# Patient Record
Sex: MALE | Race: WHITE | NOT HISPANIC OR LATINO | URBAN - NONMETROPOLITAN AREA
[De-identification: names, ages, dates, MRNs, and addresses within clinical notes are randomized per-mention and may not be internally consistent; named-entity substitution may affect disease eponyms.]

---

## 2017-03-28 RX ORDER — LISINOPRIL AND HYDROCHLOROTHIAZIDE 20; 12.5 MG/1; MG/1
1 TABLET ORAL DAILY
Qty: 30 TABLET | Refills: 2 | Status: SHIPPED | OUTPATIENT
Start: 2017-03-28 | End: 2017-06-30

## 2017-06-30 ENCOUNTER — OFFICE VISIT (OUTPATIENT)
Dept: FAMILY MEDICINE CLINIC | Facility: CLINIC | Age: 62
End: 2017-06-30

## 2017-06-30 VITALS
SYSTOLIC BLOOD PRESSURE: 144 MMHG | HEIGHT: 65 IN | DIASTOLIC BLOOD PRESSURE: 82 MMHG | BODY MASS INDEX: 28.49 KG/M2 | HEART RATE: 84 BPM | WEIGHT: 171 LBS

## 2017-06-30 DIAGNOSIS — Z12.5 PROSTATE CANCER SCREENING: ICD-10-CM

## 2017-06-30 DIAGNOSIS — M25.511 CHRONIC RIGHT SHOULDER PAIN: ICD-10-CM

## 2017-06-30 DIAGNOSIS — I10 ESSENTIAL HYPERTENSION: Primary | ICD-10-CM

## 2017-06-30 DIAGNOSIS — G89.29 CHRONIC RIGHT SHOULDER PAIN: ICD-10-CM

## 2017-06-30 PROCEDURE — 99213 OFFICE O/P EST LOW 20 MIN: CPT | Performed by: NURSE PRACTITIONER

## 2017-06-30 RX ORDER — TRAMADOL HYDROCHLORIDE 50 MG/1
TABLET ORAL
Qty: 30 TABLET | Refills: 0 | Status: SHIPPED | OUTPATIENT
Start: 2017-06-30 | End: 2018-07-24

## 2017-06-30 RX ORDER — LISINOPRIL 20 MG/1
20 TABLET ORAL DAILY
COMMUNITY
End: 2017-07-05

## 2017-07-03 RX ORDER — LISINOPRIL AND HYDROCHLOROTHIAZIDE 20; 12.5 MG/1; MG/1
TABLET ORAL
Qty: 30 TABLET | Refills: 4 | Status: SHIPPED | OUTPATIENT
Start: 2017-07-03 | End: 2017-07-05 | Stop reason: SDUPTHER

## 2017-07-05 RX ORDER — LISINOPRIL AND HYDROCHLOROTHIAZIDE 20; 12.5 MG/1; MG/1
1 TABLET ORAL DAILY
Qty: 30 TABLET | Refills: 4 | Status: SHIPPED | OUTPATIENT
Start: 2017-07-05 | End: 2017-12-11 | Stop reason: SDUPTHER

## 2017-07-07 RX ORDER — PREDNISONE 10 MG/1
TABLET ORAL
Qty: 21 TABLET | Refills: 0 | Status: SHIPPED | OUTPATIENT
Start: 2017-07-07 | End: 2018-07-24

## 2017-07-10 DIAGNOSIS — M75.81 BONE SPUR OF ACROMIOCLAVICULAR JOINT, RIGHT: Primary | ICD-10-CM

## 2017-07-18 ENCOUNTER — OFFICE VISIT (OUTPATIENT)
Dept: ORTHOPEDIC SURGERY | Facility: CLINIC | Age: 62
End: 2017-07-18

## 2017-07-18 VITALS — HEIGHT: 65 IN | WEIGHT: 171 LBS | BODY MASS INDEX: 28.49 KG/M2

## 2017-07-18 DIAGNOSIS — M25.511 ACUTE PAIN OF RIGHT SHOULDER: Primary | ICD-10-CM

## 2017-07-18 DIAGNOSIS — M19.011 OSTEOARTHRITIS OF RIGHT SHOULDER, UNSPECIFIED OSTEOARTHRITIS TYPE: ICD-10-CM

## 2017-07-18 DIAGNOSIS — M75.41 IMPINGEMENT SYNDROME, SHOULDER, RIGHT: ICD-10-CM

## 2017-07-18 PROBLEM — M75.40 IMPINGEMENT SYNDROME, SHOULDER: Status: ACTIVE | Noted: 2017-07-18

## 2017-07-18 PROCEDURE — 99214 OFFICE O/P EST MOD 30 MIN: CPT | Performed by: NURSE PRACTITIONER

## 2017-07-18 PROCEDURE — 20610 DRAIN/INJ JOINT/BURSA W/O US: CPT | Performed by: NURSE PRACTITIONER

## 2017-07-18 RX ORDER — TRIAMCINOLONE ACETONIDE 40 MG/ML
40 INJECTION, SUSPENSION INTRA-ARTICULAR; INTRAMUSCULAR
Status: COMPLETED | OUTPATIENT
Start: 2017-07-18 | End: 2017-07-18

## 2017-07-18 RX ORDER — LIDOCAINE HYDROCHLORIDE 10 MG/ML
2 INJECTION, SOLUTION INFILTRATION; PERINEURAL
Status: COMPLETED | OUTPATIENT
Start: 2017-07-18 | End: 2017-07-18

## 2017-07-18 RX ORDER — MELOXICAM 15 MG/1
15 TABLET ORAL DAILY
Qty: 30 TABLET | Refills: 1 | Status: SHIPPED | OUTPATIENT
Start: 2017-07-18 | End: 2017-08-17

## 2017-07-18 RX ADMIN — LIDOCAINE HYDROCHLORIDE 2 ML: 10 INJECTION, SOLUTION INFILTRATION; PERINEURAL at 10:05

## 2017-07-18 RX ADMIN — TRIAMCINOLONE ACETONIDE 40 MG: 40 INJECTION, SUSPENSION INTRA-ARTICULAR; INTRAMUSCULAR at 10:05

## 2017-07-18 NOTE — PROGRESS NOTES
Mark Gómez  is a 61 y.o. male   Primary provider:  ALIYA Marquez       Chief Complaint   Patient presents with   • Right Shoulder - Establish Care, Pain       HISTORY OF PRESENT ILLNESS:    HPI Comments: Complains of right shoulder pain since falling off a ramp in May 2017. Pain described as sharp at times and aching with popping, grinding and giving. Patient reports aching pain keeps him awake at night. Pain increased with movement of right arm. Improves with rest and applying pressure to the top of his shoulder. Seen by PCP last week, prescribed prednisone and had x-rays done.     Pain   This is a new problem. The current episode started more than 1 month ago. The problem occurs constantly. The problem has been unchanged. Associated symptoms comments: Sharp pain with dull ache, grinding, and popping. Exacerbated by: use of right arm. Treatments tried: steroids (prednisone) The treatment provided mild relief.        CONCURRENT MEDICAL HISTORY:    Past Medical History:   Diagnosis Date   • Acquired deformity of wrist    • Benign essential hypertension    • Cellulitis and abscess of neck    • Essential hypertension    • Pain in wrist    • Tobacco use        No Known Allergies      Current Outpatient Prescriptions:   •  diclofenac (VOLTAREN) 1 % gel gel, Apply  topically. Appply to wrist four times a day as needed for pain., Disp: , Rfl:   •  lisinopril-hydrochlorothiazide (PRINZIDE,ZESTORETIC) 20-12.5 MG per tablet, Take 1 tablet by mouth Daily., Disp: 30 tablet, Rfl: 4  •  meloxicam (MOBIC) 15 MG tablet, Take 1 tablet by mouth Daily for 30 days., Disp: 30 tablet, Rfl: 1  •  nicotine (NICODERM CQ) 14 MG/24HR patch, Place  on the skin. As directed, Disp: , Rfl:   •  predniSONE (DELTASONE) 10 MG tablet, Take po as dose pack, Disp: 21 tablet, Rfl: 0  •  traMADol (ULTRAM) 50 MG tablet, Take 1-2 po at HS for shoulder pain., Disp: 30 tablet, Rfl: 0    No past surgical history on file.    Family History  "  Problem Relation Age of Onset   • Cancer Mother         Social History     Social History   • Marital status: Single     Spouse name: N/A   • Number of children: N/A   • Years of education: N/A     Occupational History   • Not on file.     Social History Main Topics   • Smoking status: Current Every Day Smoker     Packs/day: 1.00     Types: Cigarettes   • Smokeless tobacco: Never Used   • Alcohol use Yes      Comment: Occasionally   • Drug use: Not on file   • Sexual activity: Defer     Other Topics Concern   • Not on file     Social History Narrative        Review of Systems   Musculoskeletal:        Stiffness in joints. Right shoulder pain.    Psychiatric/Behavioral: Positive for sleep disturbance.   All other systems reviewed and are negative.      PHYSICAL EXAMINATION:       Ht 65\" (165.1 cm)  Wt 171 lb (77.6 kg)  BMI 28.46 kg/m2    Physical Exam   Constitutional: He is oriented to person, place, and time. Vital signs are normal. He appears well-developed and well-nourished.   HENT:   Head: Normocephalic.   Pulmonary/Chest: Effort normal. No respiratory distress.   Abdominal: Soft. He exhibits no distension.   Neurological: He is alert and oriented to person, place, and time. GCS eye subscore is 4. GCS verbal subscore is 5. GCS motor subscore is 6.   Skin: Skin is warm, dry and intact.   Psychiatric: He has a normal mood and affect. His speech is normal and behavior is normal. Judgment and thought content normal. Cognition and memory are normal.   Vitals reviewed.      GAIT:     [x]  Normal  []  Antalgic    Assistive device: [x]  None  []  Walker     []  Crutches  []  Cane     []  Wheelchair  []  Stretcher    Right Shoulder Exam     Tenderness   The patient is experiencing tenderness in the acromioclavicular joint (diffuse, mild).    Range of Motion   Active Abduction:  150 abnormal   Passive Abduction:  160 abnormal   Forward Flexion:  150 abnormal   External Rotation: abnormal   Internal Rotation 90 " degrees: abnormal     Muscle Strength   The patient has normal right shoulder strength.    Tests   Cross Arm: positive  Hawkin's test: positive  Impingement: positive    Other   Erythema: absent  Scars: absent  Sensation: normal  Pulse: present    Comments:  Significant limitations with external rotation attempt. Pain with internal rotation, abduction and forward flexion. Empty can test positive.         Large Joint Arthrocentesis  Date/Time: 7/18/2017 10:05 AM  Consent given by: patient  Timeout: Immediately prior to procedure a time out was called to verify the correct patient, procedure, equipment, support staff and site/side marked as required   Supporting Documentation  Indications: pain and diagnostic evaluation   Procedure Details  Location: shoulder - R subacromial bursa  Preparation: Patient was prepped and draped in the usual sterile fashion  Needle size: 22 G  Approach: posterior  Medications administered: 40 mg triamcinolone acetonide 40 MG/ML; 2 mL lidocaine 1 %  Patient tolerance: patient tolerated the procedure well with no immediate complications          Xr Shoulder 2+ View Right    Result Date: 7/10/2017  Narrative: Procedure: Right    shoulder INDICATION: Right shoulder pain   . Trauma patient fell. Technique: Three    views. Prior examination: None. FINDINGS: Atherosclerotic changes, joint space narrowing glenohumeral joint. Degenerative changes right acromioclavicular joint. Large distal clavicular spur projecting inferiorly. This may cause underlying impingement. The right shoulder is otherwise unremarkable.     Impression: CONCLUSION: Arthritic changes glenohumeral joint. Degenerative changes right, acromioclavicular  joint. Large distal clavicular spur projecting inferiorly. This may cause underlying impingement. The right shoulder is otherwise unremarkable. No acute traumatic changes are present. Electronically signed by:  Yang Blair MD  7/10/2017 9:56 AM CDT Workstation:  [id number]      ASSESSMENT:    Diagnoses and all orders for this visit:    Acute pain of right shoulder    Impingement syndrome, shoulder, right    Osteoarthritis of right shoulder, unspecified osteoarthritis type    Other orders  -     meloxicam (MOBIC) 15 MG tablet; Take 1 tablet by mouth Daily for 30 days.  -     Large Joint Arthrocentesis      PLAN    X-rays of right shoulder done on 7/10/2017 reviewed today. Recommend subacromial injection today of right shoulder for pain management. Recommend starting Mobic daily for pain due to degenerative changes noted on x-rays and aching right shoulder pain that keeps him awake at night. Discussed further conservative management options, including physical therapy. Patient declines physical therapy at this time. Follow up in 4 weeks for recheck. Consider MRI of right shoulder if pain/symptoms persist since patient's pain did not start until he fell in May.     Return in about 4 weeks (around 8/15/2017) for Recheck.    ALIYA Solo

## 2017-07-31 ENCOUNTER — OFFICE VISIT (OUTPATIENT)
Dept: FAMILY MEDICINE CLINIC | Facility: CLINIC | Age: 62
End: 2017-07-31

## 2017-07-31 VITALS
WEIGHT: 168 LBS | SYSTOLIC BLOOD PRESSURE: 118 MMHG | HEART RATE: 78 BPM | DIASTOLIC BLOOD PRESSURE: 80 MMHG | HEIGHT: 65 IN | BODY MASS INDEX: 27.99 KG/M2

## 2017-07-31 DIAGNOSIS — I10 BENIGN ESSENTIAL HYPERTENSION: ICD-10-CM

## 2017-07-31 DIAGNOSIS — M25.511 CHRONIC RIGHT SHOULDER PAIN: Primary | ICD-10-CM

## 2017-07-31 DIAGNOSIS — G89.29 CHRONIC RIGHT SHOULDER PAIN: Primary | ICD-10-CM

## 2017-07-31 PROCEDURE — 99213 OFFICE O/P EST LOW 20 MIN: CPT | Performed by: NURSE PRACTITIONER

## 2017-07-31 NOTE — PROGRESS NOTES
Subjective   Mark Gómez Sr is a 61 y.o. male.   Chief Complaint   Patient presents with   • Follow-up     orthopedic visit     Here to f/u on his chronic right shoulder pain and HTN.    His blood pressure is doing well and he is currently not taking his medication.    His right shoulder pain improved after a joint steroid injection given on July 18th. He is no longer taking the meloxicam and rarely takes tramadol.       History of Present Illness     The following portions of the patient's history were reviewed and updated as appropriate: allergies, current medications, past family history, past medical history, past social history and problem list.    Current Outpatient Prescriptions:   •  diclofenac (VOLTAREN) 1 % gel gel, Apply  topically. Appply to wrist four times a day as needed for pain., Disp: , Rfl:   •  lisinopril-hydrochlorothiazide (PRINZIDE,ZESTORETIC) 20-12.5 MG per tablet, Take 1 tablet by mouth Daily., Disp: 30 tablet, Rfl: 4  •  meloxicam (MOBIC) 15 MG tablet, Take 1 tablet by mouth Daily for 30 days., Disp: 30 tablet, Rfl: 1  •  nicotine (NICODERM CQ) 14 MG/24HR patch, Place  on the skin. As directed, Disp: , Rfl:   •  predniSONE (DELTASONE) 10 MG tablet, Take po as dose pack, Disp: 21 tablet, Rfl: 0  •  traMADol (ULTRAM) 50 MG tablet, Take 1-2 po at HS for shoulder pain., Disp: 30 tablet, Rfl: 0  Review of Systems   Constitutional: Negative for activity change, chills, fatigue and fever.   HENT: Negative for congestion, rhinorrhea, sinus pressure and sore throat.    Eyes: Negative for pain, discharge, itching and visual disturbance.   Respiratory: Negative for cough, shortness of breath and wheezing.    Cardiovascular: Negative for chest pain, palpitations and leg swelling.   Gastrointestinal: Negative for abdominal pain, blood in stool, constipation, diarrhea, nausea and vomiting.   Endocrine: Negative for polydipsia and polyuria.   Genitourinary: Negative for dysuria, flank pain, frequency,  "hematuria and urgency.   Musculoskeletal: Positive for arthralgias. Negative for back pain, gait problem and myalgias.   Skin: Negative for rash.   Neurological: Negative for dizziness, tremors, seizures, syncope, weakness and headaches.   Hematological: Negative for adenopathy. Does not bruise/bleed easily.   Psychiatric/Behavioral: Negative for confusion, dysphoric mood, sleep disturbance and suicidal ideas. The patient is not nervous/anxious.        Objective    Vitals:    07/31/17 0802   BP: 118/80   Pulse: 78   Weight: 168 lb (76.2 kg)   Height: 65\" (165.1 cm)   PainSc:   1   PainLoc: Shoulder  Comment: right     Physical Exam   Constitutional: He is oriented to person, place, and time. He appears well-developed and well-nourished. No distress.   Eyes: Conjunctivae are normal. Pupils are equal, round, and reactive to light. Right eye exhibits no discharge. Left eye exhibits no discharge. No scleral icterus.   Neck: Neck supple. No thyromegaly present.   Cardiovascular: Normal rate, regular rhythm and normal heart sounds.    No murmur heard.  Pulmonary/Chest: Effort normal and breath sounds normal. No respiratory distress. He has no wheezes. He has no rales.   Musculoskeletal: He exhibits no edema, tenderness or deformity.   Lymphadenopathy:     He has no cervical adenopathy.   Neurological: He is alert and oriented to person, place, and time. No cranial nerve deficit. Coordination normal.   No balance disturbance observed.    Skin: Skin is warm and dry. No rash noted. No pallor.   Psychiatric: He has a normal mood and affect. His behavior is normal.   Nursing note and vitals reviewed.      Assessment/Plan   Mark was seen today for follow-up.    Diagnoses and all orders for this visit:    Chronic right shoulder pain    Benign essential hypertension      We will resume the meloxicam if needed. He will monitor his B/P and resume lisinopril HCT if needed.   F/U in 6 mos .          "

## 2017-12-11 RX ORDER — LISINOPRIL AND HYDROCHLOROTHIAZIDE 20; 12.5 MG/1; MG/1
1 TABLET ORAL DAILY
Qty: 30 TABLET | Refills: 4 | Status: SHIPPED | OUTPATIENT
Start: 2017-12-11 | End: 2018-01-22 | Stop reason: SDUPTHER

## 2018-01-22 RX ORDER — LISINOPRIL AND HYDROCHLOROTHIAZIDE 20; 12.5 MG/1; MG/1
1 TABLET ORAL DAILY
Qty: 30 TABLET | Refills: 4 | Status: SHIPPED | OUTPATIENT
Start: 2018-01-22 | End: 2018-07-24 | Stop reason: SDUPTHER

## 2018-07-11 ENCOUNTER — TELEPHONE (OUTPATIENT)
Dept: FAMILY MEDICINE CLINIC | Facility: CLINIC | Age: 63
End: 2018-07-11

## 2018-07-11 DIAGNOSIS — I10 ESSENTIAL HYPERTENSION: Primary | ICD-10-CM

## 2018-07-11 DIAGNOSIS — Z12.5 SCREENING FOR PROSTATE CANCER: ICD-10-CM

## 2018-07-16 ENCOUNTER — LAB (OUTPATIENT)
Dept: LAB | Facility: OTHER | Age: 63
End: 2018-07-16

## 2018-07-16 DIAGNOSIS — Z12.5 SCREENING FOR PROSTATE CANCER: ICD-10-CM

## 2018-07-16 DIAGNOSIS — I10 ESSENTIAL HYPERTENSION: ICD-10-CM

## 2018-07-16 LAB
ALBUMIN SERPL-MCNC: 4.2 G/DL (ref 3.5–5)
ALBUMIN/GLOB SERPL: 1.6 G/DL (ref 1.1–1.8)
ALP SERPL-CCNC: 56 U/L (ref 38–126)
ALT SERPL W P-5'-P-CCNC: 24 U/L
ANION GAP SERPL CALCULATED.3IONS-SCNC: 10 MMOL/L (ref 5–15)
AST SERPL-CCNC: 20 U/L (ref 17–59)
BASOPHILS # BLD AUTO: 0.03 10*3/MM3 (ref 0–0.2)
BASOPHILS NFR BLD AUTO: 0.4 % (ref 0–2)
BILIRUB SERPL-MCNC: 0.3 MG/DL (ref 0.2–1.3)
BUN BLD-MCNC: 27 MG/DL (ref 9–20)
BUN/CREAT SERPL: 21.3 (ref 7–25)
CALCIUM SPEC-SCNC: 8.8 MG/DL (ref 8.4–10.2)
CHLORIDE SERPL-SCNC: 105 MMOL/L (ref 98–107)
CHOLEST SERPL-MCNC: 174 MG/DL (ref 150–200)
CO2 SERPL-SCNC: 24 MMOL/L (ref 22–30)
CREAT BLD-MCNC: 1.27 MG/DL (ref 0.66–1.25)
DEPRECATED RDW RBC AUTO: 46.1 FL (ref 35.1–43.9)
EOSINOPHIL # BLD AUTO: 0.14 10*3/MM3 (ref 0–0.7)
EOSINOPHIL NFR BLD AUTO: 1.8 % (ref 0–7)
ERYTHROCYTE [DISTWIDTH] IN BLOOD BY AUTOMATED COUNT: 13.5 % (ref 11.5–14.5)
GFR SERPL CREATININE-BSD FRML MDRD: 57 ML/MIN/1.73 (ref 49–113)
GLOBULIN UR ELPH-MCNC: 2.7 GM/DL (ref 2.3–3.5)
GLUCOSE BLD-MCNC: 107 MG/DL (ref 74–99)
HCT VFR BLD AUTO: 38.2 % (ref 39–49)
HDLC SERPL-MCNC: 46 MG/DL (ref 40–59)
HGB BLD-MCNC: 12.5 G/DL (ref 13.7–17.3)
LDLC SERPL CALC-MCNC: 98 MG/DL
LDLC/HDLC SERPL: 2.13 {RATIO} (ref 0–3.55)
LYMPHOCYTES # BLD AUTO: 2.56 10*3/MM3 (ref 0.6–4.2)
LYMPHOCYTES NFR BLD AUTO: 32.7 % (ref 10–50)
MCH RBC QN AUTO: 31.5 PG (ref 26.5–34)
MCHC RBC AUTO-ENTMCNC: 32.7 G/DL (ref 31.5–36.3)
MCV RBC AUTO: 96.2 FL (ref 80–98)
MONOCYTES # BLD AUTO: 0.7 10*3/MM3 (ref 0–0.9)
MONOCYTES NFR BLD AUTO: 8.9 % (ref 0–12)
NEUTROPHILS # BLD AUTO: 4.4 10*3/MM3 (ref 2–8.6)
NEUTROPHILS NFR BLD AUTO: 56.2 % (ref 37–80)
PLATELET # BLD AUTO: 239 10*3/MM3 (ref 150–450)
PMV BLD AUTO: 10.3 FL (ref 8–12)
POTASSIUM BLD-SCNC: 4.7 MMOL/L (ref 3.4–5)
PROT SERPL-MCNC: 6.9 G/DL (ref 6.3–8.2)
PSA SERPL-MCNC: 0.81 NG/ML (ref 0–4)
RBC # BLD AUTO: 3.97 10*6/MM3 (ref 4.37–5.74)
SODIUM BLD-SCNC: 139 MMOL/L (ref 137–145)
T4 FREE SERPL-MCNC: 0.82 NG/DL (ref 0.78–2.19)
TRIGL SERPL-MCNC: 151 MG/DL
TSH SERPL DL<=0.05 MIU/L-ACNC: 4.43 MIU/ML (ref 0.46–4.68)
VLDLC SERPL-MCNC: 30.2 MG/DL
WBC NRBC COR # BLD: 7.83 10*3/MM3 (ref 3.2–9.8)

## 2018-07-16 PROCEDURE — 80061 LIPID PANEL: CPT | Performed by: NURSE PRACTITIONER

## 2018-07-16 PROCEDURE — 80050 GENERAL HEALTH PANEL: CPT | Performed by: NURSE PRACTITIONER

## 2018-07-16 PROCEDURE — 84439 ASSAY OF FREE THYROXINE: CPT | Performed by: NURSE PRACTITIONER

## 2018-07-16 PROCEDURE — G0103 PSA SCREENING: HCPCS | Performed by: NURSE PRACTITIONER

## 2018-07-24 ENCOUNTER — OFFICE VISIT (OUTPATIENT)
Dept: FAMILY MEDICINE CLINIC | Facility: CLINIC | Age: 63
End: 2018-07-24

## 2018-07-24 VITALS
BODY MASS INDEX: 27.8 KG/M2 | TEMPERATURE: 98.6 F | SYSTOLIC BLOOD PRESSURE: 124 MMHG | HEART RATE: 82 BPM | HEIGHT: 66 IN | RESPIRATION RATE: 16 BRPM | DIASTOLIC BLOOD PRESSURE: 90 MMHG | WEIGHT: 173 LBS | OXYGEN SATURATION: 98 %

## 2018-07-24 DIAGNOSIS — G89.29 CHRONIC PAIN OF LEFT WRIST: ICD-10-CM

## 2018-07-24 DIAGNOSIS — K21.9 GASTROESOPHAGEAL REFLUX DISEASE WITHOUT ESOPHAGITIS: ICD-10-CM

## 2018-07-24 DIAGNOSIS — M25.532 CHRONIC PAIN OF LEFT WRIST: ICD-10-CM

## 2018-07-24 DIAGNOSIS — M19.011 OSTEOARTHRITIS OF RIGHT SHOULDER, UNSPECIFIED OSTEOARTHRITIS TYPE: ICD-10-CM

## 2018-07-24 DIAGNOSIS — I10 ESSENTIAL HYPERTENSION: Primary | ICD-10-CM

## 2018-07-24 DIAGNOSIS — M25.511 CHRONIC RIGHT SHOULDER PAIN: ICD-10-CM

## 2018-07-24 DIAGNOSIS — M75.41 IMPINGEMENT SYNDROME OF RIGHT SHOULDER: ICD-10-CM

## 2018-07-24 DIAGNOSIS — G89.29 CHRONIC RIGHT SHOULDER PAIN: ICD-10-CM

## 2018-07-24 PROCEDURE — 99214 OFFICE O/P EST MOD 30 MIN: CPT | Performed by: NURSE PRACTITIONER

## 2018-07-24 RX ORDER — LISINOPRIL AND HYDROCHLOROTHIAZIDE 20; 12.5 MG/1; MG/1
1 TABLET ORAL DAILY
Qty: 90 TABLET | Refills: 2 | Status: SHIPPED | OUTPATIENT
Start: 2018-07-24 | End: 2019-05-06 | Stop reason: SDUPTHER

## 2018-08-12 PROBLEM — M25.511 ACUTE PAIN OF RIGHT SHOULDER: Status: RESOLVED | Noted: 2017-07-18 | Resolved: 2018-08-12

## 2018-08-12 PROBLEM — M25.532 CHRONIC PAIN OF LEFT WRIST: Status: ACTIVE | Noted: 2018-08-12

## 2018-08-12 PROBLEM — G89.29 CHRONIC PAIN OF LEFT WRIST: Status: ACTIVE | Noted: 2018-08-12

## 2018-08-12 PROBLEM — K21.9 GASTROESOPHAGEAL REFLUX DISEASE WITHOUT ESOPHAGITIS: Status: ACTIVE | Noted: 2018-08-12

## 2018-08-13 NOTE — PROGRESS NOTES
Subjective   Mark Gómez Sr is a 62 y.o. male who presents to the office to [facility].     History of Present Illness     Labs done on 7/16/18 reviewed during appointment.  CBC shows anemia.  CMP shows fasting blood glucose 107, BUN 27, creatinine 1.27.  Cholesterol panel within normal limits.  Thyroid levels within normal limits.  PSA within normal limits.    GERD-chronic, controlled intermittently with Tums, patient does not want anything else.    History of chronic left wrist and right shoulder pain-chronic, controlled with diclofenac gel.  Patient does not want anything else for this.    Hypertension-chronic, controlled with lisinopril-HCTZ 20-12.5 mg daily.    Health maintenance, reviewed with patient, patient refuses annual physical.,  Colonoscopy, pneumonia shot, tetanus shot, hepatitis C testing, and shingles shot.    The following portions of the patient's history were reviewed and updated as appropriate: allergies, current medications, past family history, past medical history, past social history, past surgical history and problem list.    Chuckie obtained and reviewed, patient has clean chuckie.     Review of Systems   Constitutional: Negative for activity change, appetite change, chills, diaphoresis, fatigue, fever and unexpected weight change.   HENT: Negative for congestion, ear discharge, ear pain, hearing loss, postnasal drip, rhinorrhea, sinus pain, sinus pressure, sneezing, sore throat, tinnitus and trouble swallowing.    Eyes: Negative.    Respiratory: Negative for cough, chest tightness, shortness of breath and wheezing.    Cardiovascular: Negative for chest pain, palpitations and leg swelling.   Gastrointestinal: Negative for abdominal distention, abdominal pain, blood in stool, constipation, diarrhea, nausea and vomiting.   Genitourinary: Negative for decreased urine volume, discharge, dysuria, flank pain, frequency, hematuria, penile pain, penile swelling, scrotal swelling, testicular pain  "and urgency.   Musculoskeletal: Positive for arthralgias. Negative for back pain, gait problem, joint swelling, myalgias, neck pain and neck stiffness.   Neurological: Negative for dizziness, tremors, seizures, syncope, weakness, light-headedness, numbness and headaches.   Hematological: Does not bruise/bleed easily.   Psychiatric/Behavioral: Negative for agitation, behavioral problems, decreased concentration, self-injury, sleep disturbance and suicidal ideas. The patient is not nervous/anxious.        Past Medical History:   Diagnosis Date   • Acquired deformity of wrist    • Benign essential hypertension    • Cellulitis and abscess of neck    • Essential hypertension    • Pain in wrist    • Tobacco use        Family History   Problem Relation Age of Onset   • Cancer Mother           Objective   /90   Pulse 82   Temp 98.6 °F (37 °C) (Temporal Artery )   Resp 16   Ht 167.6 cm (66\")   Wt 78.5 kg (173 lb)   SpO2 98%   BMI 27.92 kg/m²   Physical Exam   Constitutional: He is oriented to person, place, and time. He appears well-developed and well-nourished. He is cooperative. He does not appear ill.   HENT:   Head: Normocephalic.   Right Ear: Hearing, tympanic membrane, external ear and ear canal normal.   Left Ear: Hearing, tympanic membrane, external ear and ear canal normal.   Nose: Nose normal.   Mouth/Throat: Oropharynx is clear and moist. No oropharyngeal exudate.   Eyes: Pupils are equal, round, and reactive to light. EOM and lids are normal. Right eye exhibits no discharge. Left eye exhibits no discharge. Right conjunctiva is not injected. Left conjunctiva is not injected.   Neck: Normal range of motion. Neck supple.   Cardiovascular: Normal rate, regular rhythm, normal heart sounds and intact distal pulses.  Exam reveals no gallop and no friction rub.    No murmur heard.  Pulmonary/Chest: Effort normal and breath sounds normal. No respiratory distress. He has no wheezes. He has no rales. "   Abdominal: Soft. Normal appearance, normal aorta and bowel sounds are normal. He exhibits no distension and no mass. There is no tenderness. There is no rebound and no guarding. No hernia.   Musculoskeletal: He exhibits no edema or deformity.        Right shoulder: He exhibits decreased range of motion, tenderness and pain.        Left wrist: He exhibits tenderness.   Lymphadenopathy:     He has no cervical adenopathy.   Neurological: He is alert and oriented to person, place, and time. He has normal strength and normal reflexes. He displays normal reflexes. No cranial nerve deficit or sensory deficit. He exhibits normal muscle tone. He displays a negative Romberg sign. Coordination normal.   Reflex Scores:       Patellar reflexes are 2+ on the right side and 2+ on the left side.  Skin: Skin is warm, dry and intact. No rash noted. He is not diaphoretic. No erythema. No pallor.   Psychiatric: He has a normal mood and affect. His speech is normal and behavior is normal. Thought content normal. Cognition and memory are normal.   Patient is dressed appropriately for weather and situation, makes eye contact, and engages in conversation.  He is attentive.   Nursing note and vitals reviewed.       PHQ-2/PHQ-9 Depression Screening 7/24/2018   Little interest or pleasure in doing things 0   Feeling down, depressed, or hopeless 0   Total Score 0         Assessment/Plan   Mark was seen today for establish care.    Diagnoses and all orders for this visit:    Essential hypertension  -     lisinopril-hydrochlorothiazide (PRINZIDE,ZESTORETIC) 20-12.5 MG per tablet; Take 1 tablet by mouth Daily.  -     CBC & Differential; Future  -     Comprehensive Metabolic Panel; Future  -     Lipid Panel; Future    Gastroesophageal reflux disease without esophagitis    Chronic right shoulder pain    Impingement syndrome of right shoulder    Osteoarthritis of right shoulder, unspecified osteoarthritis type    Chronic pain of left  wrist           Labs done on 7/16/18 reviewed during appointment.  CBC shows anemia.  CMP shows fasting blood glucose 107, BUN 27, creatinine 1.27.  Cholesterol panel within normal limits.  Thyroid levels within normal limits.  PSA within normal limits.    GERD-chronic, controlled intermittently with Tums, patient does not want anything else.    History of chronic left wrist and right shoulder pain-chronic, controlled with diclofenac gel.  Patient does not want anything else for this.    Hypertension-chronic, controlled with lisinopril-HCTZ 20-12.5 mg daily.    Health maintenance, reviewed with patient, patient refuses annual physical.,  Colonoscopy, pneumonia shot, tetanus shot, hepatitis C testing, and shingles shot.    Follow-up in 6 months with labs prior.    Patient educated to follow-up sooner than next scheduled appointment if condition(s) worse or do not improve. Patient states understanding and is in agreeance with plan of care. An After Visit Summary was printed and given to the patient.      ALIYA Tan        This document has been electronically signed by ALIYA Tan on August 12, 2018 8:46 PM      EMR/Transcription Dragon Disclaimer:  Some of this note may be an electronic dragon transcription/translation of spoken language to printed text. The electronic translation of spoken language may permit erroneous, or at times, nonsensical words or phrases to be inadvertently transcribed. Although I have reviewed the note for such errors, some may still exist.

## 2019-05-06 ENCOUNTER — TELEPHONE (OUTPATIENT)
Dept: FAMILY MEDICINE CLINIC | Facility: CLINIC | Age: 64
End: 2019-05-06

## 2019-05-06 DIAGNOSIS — I10 ESSENTIAL HYPERTENSION: ICD-10-CM

## 2019-05-06 RX ORDER — LISINOPRIL AND HYDROCHLOROTHIAZIDE 20; 12.5 MG/1; MG/1
1 TABLET ORAL DAILY
Qty: 30 TABLET | Refills: 0 | Status: SHIPPED | OUTPATIENT
Start: 2019-05-06 | End: 2019-05-09 | Stop reason: SDUPTHER

## 2019-05-09 DIAGNOSIS — I10 ESSENTIAL HYPERTENSION: ICD-10-CM

## 2019-05-09 RX ORDER — LISINOPRIL AND HYDROCHLOROTHIAZIDE 20; 12.5 MG/1; MG/1
1 TABLET ORAL DAILY
Qty: 30 TABLET | Refills: 0 | Status: SHIPPED | OUTPATIENT
Start: 2019-05-09 | End: 2019-05-28 | Stop reason: SDUPTHER

## 2019-05-15 ENCOUNTER — LAB (OUTPATIENT)
Dept: LAB | Facility: OTHER | Age: 64
End: 2019-05-15

## 2019-05-15 DIAGNOSIS — I10 ESSENTIAL HYPERTENSION: ICD-10-CM

## 2019-05-15 LAB
ALBUMIN SERPL-MCNC: 4.2 G/DL (ref 3.5–5)
ALBUMIN/GLOB SERPL: 1.3 G/DL (ref 1.1–1.8)
ALP SERPL-CCNC: 80 U/L (ref 38–126)
ALT SERPL W P-5'-P-CCNC: 42 U/L
ANION GAP SERPL CALCULATED.3IONS-SCNC: 10 MMOL/L (ref 5–15)
AST SERPL-CCNC: 38 U/L (ref 17–59)
BASOPHILS # BLD AUTO: 0.03 10*3/MM3 (ref 0–0.2)
BASOPHILS NFR BLD AUTO: 0.3 % (ref 0–1.5)
BILIRUB SERPL-MCNC: 0.5 MG/DL (ref 0.2–1.3)
BUN BLD-MCNC: 30 MG/DL (ref 7–23)
BUN/CREAT SERPL: 16.7 (ref 7–25)
CALCIUM SPEC-SCNC: 9.9 MG/DL (ref 8.4–10.2)
CHLORIDE SERPL-SCNC: 105 MMOL/L (ref 101–112)
CHOLEST SERPL-MCNC: 157 MG/DL (ref 150–200)
CO2 SERPL-SCNC: 26 MMOL/L (ref 22–30)
CREAT BLD-MCNC: 1.8 MG/DL (ref 0.7–1.3)
DEPRECATED RDW RBC AUTO: 45.9 FL (ref 37–54)
EOSINOPHIL # BLD AUTO: 0.14 10*3/MM3 (ref 0–0.4)
EOSINOPHIL NFR BLD AUTO: 1.6 % (ref 0.3–6.2)
ERYTHROCYTE [DISTWIDTH] IN BLOOD BY AUTOMATED COUNT: 13.9 % (ref 12.3–15.4)
GFR SERPL CREATININE-BSD FRML MDRD: 38 ML/MIN/1.73 (ref 49–113)
GLOBULIN UR ELPH-MCNC: 3.2 GM/DL (ref 2.3–3.5)
GLUCOSE BLD-MCNC: 104 MG/DL (ref 70–99)
HCT VFR BLD AUTO: 39.4 % (ref 37.5–51)
HDLC SERPL-MCNC: 41 MG/DL (ref 40–59)
HGB BLD-MCNC: 13.3 G/DL (ref 13–17.7)
LDLC SERPL CALC-MCNC: 71 MG/DL
LDLC/HDLC SERPL: 1.74 {RATIO} (ref 0–3.55)
LYMPHOCYTES # BLD AUTO: 2.62 10*3/MM3 (ref 0.7–3.1)
LYMPHOCYTES NFR BLD AUTO: 29.7 % (ref 19.6–45.3)
MCH RBC QN AUTO: 32 PG (ref 26.6–33)
MCHC RBC AUTO-ENTMCNC: 33.8 G/DL (ref 31.5–35.7)
MCV RBC AUTO: 94.7 FL (ref 79–97)
MONOCYTES # BLD AUTO: 1.02 10*3/MM3 (ref 0.1–0.9)
MONOCYTES NFR BLD AUTO: 11.6 % (ref 5–12)
NEUTROPHILS # BLD AUTO: 5.01 10*3/MM3 (ref 1.7–7)
NEUTROPHILS NFR BLD AUTO: 56.8 % (ref 42.7–76)
PLATELET # BLD AUTO: 240 10*3/MM3 (ref 140–450)
PMV BLD AUTO: 10.1 FL (ref 6–12)
POTASSIUM BLD-SCNC: 3.9 MMOL/L (ref 3.4–5)
PROT SERPL-MCNC: 7.4 G/DL (ref 6.3–8.6)
RBC # BLD AUTO: 4.16 10*6/MM3 (ref 4.14–5.8)
SODIUM BLD-SCNC: 141 MMOL/L (ref 137–145)
TRIGL SERPL-MCNC: 223 MG/DL
VLDLC SERPL-MCNC: 44.6 MG/DL
WBC NRBC COR # BLD: 8.82 10*3/MM3 (ref 3.4–10.8)

## 2019-05-15 PROCEDURE — 80061 LIPID PANEL: CPT | Performed by: NURSE PRACTITIONER

## 2019-05-15 PROCEDURE — 80053 COMPREHEN METABOLIC PANEL: CPT | Performed by: NURSE PRACTITIONER

## 2019-05-15 PROCEDURE — 85025 COMPLETE CBC W/AUTO DIFF WBC: CPT | Performed by: NURSE PRACTITIONER

## 2019-05-28 ENCOUNTER — OFFICE VISIT (OUTPATIENT)
Dept: FAMILY MEDICINE CLINIC | Facility: CLINIC | Age: 64
End: 2019-05-28

## 2019-05-28 VITALS
RESPIRATION RATE: 16 BRPM | DIASTOLIC BLOOD PRESSURE: 60 MMHG | BODY MASS INDEX: 27.64 KG/M2 | OXYGEN SATURATION: 100 % | SYSTOLIC BLOOD PRESSURE: 100 MMHG | HEART RATE: 92 BPM | HEIGHT: 66 IN | TEMPERATURE: 99.1 F | WEIGHT: 172 LBS

## 2019-05-28 DIAGNOSIS — M25.532 CHRONIC PAIN OF LEFT WRIST: ICD-10-CM

## 2019-05-28 DIAGNOSIS — G89.29 CHRONIC RIGHT SHOULDER PAIN: ICD-10-CM

## 2019-05-28 DIAGNOSIS — M25.511 CHRONIC RIGHT SHOULDER PAIN: ICD-10-CM

## 2019-05-28 DIAGNOSIS — G89.29 CHRONIC PAIN OF LEFT WRIST: ICD-10-CM

## 2019-05-28 DIAGNOSIS — I10 ESSENTIAL HYPERTENSION: Primary | ICD-10-CM

## 2019-05-28 DIAGNOSIS — K21.9 GASTROESOPHAGEAL REFLUX DISEASE WITHOUT ESOPHAGITIS: ICD-10-CM

## 2019-05-28 DIAGNOSIS — R79.89 ELEVATED SERUM CREATININE: ICD-10-CM

## 2019-05-28 PROCEDURE — 99214 OFFICE O/P EST MOD 30 MIN: CPT | Performed by: NURSE PRACTITIONER

## 2019-05-28 RX ORDER — LISINOPRIL AND HYDROCHLOROTHIAZIDE 20; 12.5 MG/1; MG/1
1 TABLET ORAL DAILY
Qty: 30 TABLET | Refills: 5 | Status: SHIPPED | OUTPATIENT
Start: 2019-05-28
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
## 2019-06-18 ENCOUNTER — TELEPHONE (OUTPATIENT)
Dept: FAMILY MEDICINE CLINIC | Facility: CLINIC | Age: 64
End: 2019-06-18

## 2019-06-18 NOTE — TELEPHONE ENCOUNTER
----- Message from Radha Shafer sent at 6/18/2019  4:41 PM CDT -----  HE SAID HE HASN'T BEEN TAKING BP MEDS CAUSE YOU TOLD HIM TO ONLY TAKE 1/2 PILL IF IT WAS ABOVE 150/90 AND IT HAS ONLY GOTTEN THAT BAD 2 TIMES. THE READING /89 /90 AND THE REST OF THE TIMES THE READING WERE GOOD.   ----- Message -----  From: Joanne Farrell APRN  Sent: 6/12/2019  11:51 PM  To: Radha Shafer    Pt was supposed to lakshmi with bp/hr readings in two weeks, will you call and see how he is doig and confirm if he is taking BP meds. Also remind him to get his kidney function checked in July. Does not have to be fasting.